# Patient Record
Sex: FEMALE | Race: WHITE | NOT HISPANIC OR LATINO | URBAN - METROPOLITAN AREA
[De-identification: names, ages, dates, MRNs, and addresses within clinical notes are randomized per-mention and may not be internally consistent; named-entity substitution may affect disease eponyms.]

---

## 2018-11-30 ENCOUNTER — EMERGENCY (EMERGENCY)
Facility: HOSPITAL | Age: 48
LOS: 1 days | Discharge: HOME | End: 2018-11-30
Attending: EMERGENCY MEDICINE | Admitting: EMERGENCY MEDICINE
Payer: COMMERCIAL

## 2018-11-30 VITALS
OXYGEN SATURATION: 96 % | TEMPERATURE: 98 F | DIASTOLIC BLOOD PRESSURE: 65 MMHG | RESPIRATION RATE: 20 BRPM | HEART RATE: 72 BPM | SYSTOLIC BLOOD PRESSURE: 124 MMHG

## 2018-11-30 VITALS
TEMPERATURE: 99 F | SYSTOLIC BLOOD PRESSURE: 151 MMHG | WEIGHT: 173.06 LBS | RESPIRATION RATE: 18 BRPM | DIASTOLIC BLOOD PRESSURE: 89 MMHG | HEART RATE: 74 BPM | OXYGEN SATURATION: 100 %

## 2018-11-30 DIAGNOSIS — Z88.0 ALLERGY STATUS TO PENICILLIN: ICD-10-CM

## 2018-11-30 DIAGNOSIS — J02.9 ACUTE PHARYNGITIS, UNSPECIFIED: ICD-10-CM

## 2018-11-30 DIAGNOSIS — J03.90 ACUTE TONSILLITIS, UNSPECIFIED: ICD-10-CM

## 2018-11-30 LAB
ALBUMIN SERPL ELPH-MCNC: 4.1 G/DL — SIGNIFICANT CHANGE UP (ref 3.3–5)
ALP SERPL-CCNC: 50 U/L — SIGNIFICANT CHANGE UP (ref 40–120)
ALT FLD-CCNC: 24 U/L — SIGNIFICANT CHANGE UP (ref 10–45)
ANION GAP SERPL CALC-SCNC: 11 MMOL/L — SIGNIFICANT CHANGE UP (ref 5–17)
AST SERPL-CCNC: 21 U/L — SIGNIFICANT CHANGE UP (ref 10–40)
BASOPHILS NFR BLD AUTO: 0.3 % — SIGNIFICANT CHANGE UP (ref 0–2)
BILIRUB SERPL-MCNC: 0.6 MG/DL — SIGNIFICANT CHANGE UP (ref 0.2–1.2)
BUN SERPL-MCNC: 11 MG/DL — SIGNIFICANT CHANGE UP (ref 7–23)
CALCIUM SERPL-MCNC: 8.9 MG/DL — SIGNIFICANT CHANGE UP (ref 8.4–10.5)
CHLORIDE SERPL-SCNC: 103 MMOL/L — SIGNIFICANT CHANGE UP (ref 96–108)
CO2 SERPL-SCNC: 26 MMOL/L — SIGNIFICANT CHANGE UP (ref 22–31)
CREAT SERPL-MCNC: 0.65 MG/DL — SIGNIFICANT CHANGE UP (ref 0.5–1.3)
EOSINOPHIL NFR BLD AUTO: 2.3 % — SIGNIFICANT CHANGE UP (ref 0–6)
GLUCOSE SERPL-MCNC: 99 MG/DL — SIGNIFICANT CHANGE UP (ref 70–99)
HCT VFR BLD CALC: 37.7 % — SIGNIFICANT CHANGE UP (ref 34.5–45)
HGB BLD-MCNC: 12.4 G/DL — SIGNIFICANT CHANGE UP (ref 11.5–15.5)
LYMPHOCYTES # BLD AUTO: 26 % — SIGNIFICANT CHANGE UP (ref 13–44)
MCHC RBC-ENTMCNC: 28 PG — SIGNIFICANT CHANGE UP (ref 27–34)
MCHC RBC-ENTMCNC: 32.9 G/DL — SIGNIFICANT CHANGE UP (ref 32–36)
MCV RBC AUTO: 85.1 FL — SIGNIFICANT CHANGE UP (ref 80–100)
MONOCYTES NFR BLD AUTO: 8.5 % — SIGNIFICANT CHANGE UP (ref 2–14)
NEUTROPHILS NFR BLD AUTO: 62.9 % — SIGNIFICANT CHANGE UP (ref 43–77)
PLATELET # BLD AUTO: 257 K/UL — SIGNIFICANT CHANGE UP (ref 150–400)
POTASSIUM SERPL-MCNC: 3.9 MMOL/L — SIGNIFICANT CHANGE UP (ref 3.5–5.3)
POTASSIUM SERPL-SCNC: 3.9 MMOL/L — SIGNIFICANT CHANGE UP (ref 3.5–5.3)
PROT SERPL-MCNC: 7.4 G/DL — SIGNIFICANT CHANGE UP (ref 6–8.3)
RBC # BLD: 4.43 M/UL — SIGNIFICANT CHANGE UP (ref 3.8–5.2)
RBC # FLD: 12.8 % — SIGNIFICANT CHANGE UP (ref 10.3–16.9)
SODIUM SERPL-SCNC: 140 MMOL/L — SIGNIFICANT CHANGE UP (ref 135–145)
WBC # BLD: 7.8 K/UL — SIGNIFICANT CHANGE UP (ref 3.8–10.5)
WBC # FLD AUTO: 7.8 K/UL — SIGNIFICANT CHANGE UP (ref 3.8–10.5)

## 2018-11-30 PROCEDURE — 99284 EMERGENCY DEPT VISIT MOD MDM: CPT

## 2018-11-30 PROCEDURE — 70491 CT SOFT TISSUE NECK W/DYE: CPT

## 2018-11-30 PROCEDURE — 36415 COLL VENOUS BLD VENIPUNCTURE: CPT

## 2018-11-30 PROCEDURE — 99284 EMERGENCY DEPT VISIT MOD MDM: CPT | Mod: 25

## 2018-11-30 PROCEDURE — 85025 COMPLETE CBC W/AUTO DIFF WBC: CPT

## 2018-11-30 PROCEDURE — 96375 TX/PRO/DX INJ NEW DRUG ADDON: CPT

## 2018-11-30 PROCEDURE — 70491 CT SOFT TISSUE NECK W/DYE: CPT | Mod: 26

## 2018-11-30 PROCEDURE — 80053 COMPREHEN METABOLIC PANEL: CPT

## 2018-11-30 PROCEDURE — 96374 THER/PROPH/DIAG INJ IV PUSH: CPT | Mod: XU

## 2018-11-30 RX ORDER — DEXAMETHASONE 0.5 MG/5ML
10 ELIXIR ORAL ONCE
Qty: 0 | Refills: 0 | Status: COMPLETED | OUTPATIENT
Start: 2018-11-30 | End: 2018-11-30

## 2018-11-30 RX ORDER — KETOROLAC TROMETHAMINE 30 MG/ML
30 SYRINGE (ML) INJECTION ONCE
Qty: 0 | Refills: 0 | Status: DISCONTINUED | OUTPATIENT
Start: 2018-11-30 | End: 2018-11-30

## 2018-11-30 RX ORDER — SODIUM CHLORIDE 9 MG/ML
1000 INJECTION INTRAMUSCULAR; INTRAVENOUS; SUBCUTANEOUS ONCE
Qty: 0 | Refills: 0 | Status: COMPLETED | OUTPATIENT
Start: 2018-11-30 | End: 2018-11-30

## 2018-11-30 RX ADMIN — Medication 10 MILLIGRAM(S): at 14:12

## 2018-11-30 RX ADMIN — Medication 30 MILLIGRAM(S): at 14:12

## 2018-11-30 RX ADMIN — SODIUM CHLORIDE 1000 MILLILITER(S): 9 INJECTION INTRAMUSCULAR; INTRAVENOUS; SUBCUTANEOUS at 14:12

## 2018-11-30 NOTE — ED PROVIDER NOTE - ENMT NEGATIVE STATEMENT, MLM
Ears: no ear pain and no hearing problems.Nose: no nasal congestion and no nasal drainage.Mouth/Throat: no dysphagia, no hoarseness, no drooling, + throat pain.Neck: no lumps, no pain, no stiffness and no swollen glands.

## 2018-11-30 NOTE — CONSULT NOTE ADULT - SUBJECTIVE AND OBJECTIVE BOX
47 year old female presents to ED with concern for worsening sore throat and swelling to left tonsil, which patient's ENT suspected was a peritonsillar abscess.  Patient states she was instructed to come to ED for further evaluation with CT neck soft tissue and ENT consultation.  Patient is awake and alert on ED arrival, no voice changes and no drooling.  She notes associated headache and states she was also previously diagnosed both with strep throat and influenza.  She is currently taking tamiflu and clindamycin as prescribed. ENT consulted to evaluate for possible PTA.    Pt confirms above history. Also states she has had 9 weeks of symptoms of sore throat. Treated with homeopathic remedies for 6 weeks. Worsening 3 weeks ago partially responded to Z pack. About 5 days ago acute worsening where she was prescribed the clindamycin and tamiflu by an ENT. Reports dysphagia, odynophagia though she says she feels much improved since received decadron and toradol. Also reports pain in left neck, left otalgia, and headaches. Reports fevers at home though afebrile in ED. She saw Dr. Martinez who offered to attempted needle aspiration of possible PTA and/or possible neck abscess. She refused in office aspiration and was sent to ED for CT scan and evaluation by ENT at [facility].    PMH: none  PSxh: none  SH: denies toxic habits    PE:  VSS, afebrile  Resp: breathing comfortably, no stridor, no drooling, no hot potato voice, speaking in full sentences  Ears; Right EAC clear, TM clear and intact; Left EAC clear, TM mildly opaque though no obvious effusion or infection  Nose: clear anteriorly  OC/OP: tonsils erythematous and mildly inflamed, Left worse than right, no exudate b/l, left tonsillar pillars also inflamed and erythematous though no obvious fluctuance on palpation, uvula midline, posterior OP clear, tongue midline and mobile, FOM soft  NEck: soft, cervical lymphadenopathy in left submental region and along levels 2-3, Prominent and tender swelling in level 2 immediately anterior and deep to left SCM, no obvious fluctuance and mobile.Full range of motion    WBC- no leukocytosis  CT independently reviewed with attending and radiology read reviewed: no abscess, tonsillitis and reactive lympadenopathy    A/P; 47 F with tonsillitis and possible superimposed influenza that is now resolved.  -No acute ENT intervention indicated at this time  -Recommend completing course of Clindamycin  -Recommend short course of steroids  -Symptomatic management   -Return precautions if worsening: high fevers, difficulty moving neck, drooling, difficulty breathing, inability to tolerate PO  -Please have patient follow up with ENT Dr. Martinez or she can follow up with Dr. Juan if desired; call to make appt ([phone])  -Please page with questions or concerns  D/w attending

## 2018-11-30 NOTE — ED PROVIDER NOTE - OBJECTIVE STATEMENT
47 year old female presents to ED with concern for worsening sore throat and swelling to left tonsil, which patient's ENT suspected was a peritonsillar abscess.  Patient states she was instructed to come to ED for further evaluation with CT neck soft tissue and ENT consultation.  Patient is awake and alert on ED arrival, no voice changes and no drooling.  She notes associated headache and states she was also previously diagnosed both with strep throat and influenza.  She is currently taking tamiflu and clindamycin as prescribed.  She denies any additional acute complaints or concerns at this time.

## 2018-11-30 NOTE — ED PROVIDER NOTE - MEDICAL DECISION MAKING DETAILS
Patient in ED with concern for possible PTA.  No trismus, swallowing secretions without difficulty.  Labs and CT neck results reviewed.  ENT in ED to evaluate and recs are made for discharge without attempt at aspiration as no abscess is noted on imaging.  Recs for continued PO clinda and steroid x 5 days.  Patient is given ENT follow up info at her request, and aware she either needs to follow up at ENT clinic or with ENT physician she saw today.  She will return to ED should her symptoms worsen or if he she has any concern prior to the recommended follow up.  Patient aware of plan and agreeable.  Will discharge at this time.

## 2018-11-30 NOTE — ED PROVIDER NOTE - CONSTITUTIONAL, MLM
normal... Non toxic.  Well appearing, well nourished, awake, alert, oriented to person, place, time/situation and in no apparent distress.

## 2018-11-30 NOTE — ED ADULT NURSE NOTE - OBJECTIVE STATEMENT
Sent in by PMD for left peritonsillar abscess x 1 week, worsening headache since yesterday and fever (101F yesterday).  Pt tested + for strep on 11/27 and + flu.  Verbalized she took tamiflu and is currently on clindamycin.  Mask given.  Denies recent travels, n/v/d.  Patient is no acute distress. Speaking clearly in full sentences. VSS.  Continue to monitor

## 2018-11-30 NOTE — ED ADULT TRIAGE NOTE - CHIEF COMPLAINT QUOTE
Pt referred by her PMD with c/o L peritonsillar abscess  for IVF/steroids and CT of neck. Pt tested + for strep on 11/27.

## 2018-11-30 NOTE — ED PROVIDER NOTE - ENMT, MLM
Airway patent, Nasal mucosa clear. Mouth with normal mucosa.  + Fullness and erythema to left tonsil, no exudate noted.  No trismus.  No drooling, no changes to voice. Throat has no vesicles, no oropharyngeal exudates and uvula is midline.

## 2018-11-30 NOTE — ED ADULT NURSE NOTE - NSIMPLEMENTINTERV_GEN_ALL_ED
Implemented All Universal Safety Interventions:  [place] to call system. Call bell, personal items and telephone within reach. Instruct patient to call for assistance. Room bathroom lighting operational. Non-slip footwear when patient is off stretcher. Physically safe environment: no spills, clutter or unnecessary equipment. Stretcher in lowest position, wheels locked, appropriate side rails in place.

## 2024-04-29 PROBLEM — Z00.00 ENCOUNTER FOR PREVENTIVE HEALTH EXAMINATION: Status: ACTIVE | Noted: 2024-04-29

## 2024-04-30 ENCOUNTER — APPOINTMENT (OUTPATIENT)
Dept: RHEUMATOLOGY | Facility: CLINIC | Age: 54
End: 2024-04-30